# Patient Record
Sex: MALE | Race: ASIAN | Employment: FULL TIME | URBAN - METROPOLITAN AREA
[De-identification: names, ages, dates, MRNs, and addresses within clinical notes are randomized per-mention and may not be internally consistent; named-entity substitution may affect disease eponyms.]

---

## 2018-10-13 PROCEDURE — 86803 HEPATITIS C AB TEST: CPT | Performed by: INTERNAL MEDICINE

## 2018-10-13 PROCEDURE — 87389 HIV-1 AG W/HIV-1&-2 AB AG IA: CPT | Performed by: INTERNAL MEDICINE

## 2019-02-06 ENCOUNTER — LAB ENCOUNTER (OUTPATIENT)
Dept: LAB | Age: 39
End: 2019-02-06
Attending: Other
Payer: COMMERCIAL

## 2019-02-06 DIAGNOSIS — Z79.899 ENCOUNTER FOR LONG-TERM (CURRENT) DRUG USE: Primary | ICD-10-CM

## 2019-02-06 LAB
T4 FREE SERPL-MCNC: 0.9 NG/DL (ref 0.9–1.8)
TSI SER-ACNC: 0.69 MIU/ML (ref 0.35–5.5)

## 2019-02-06 PROCEDURE — 84443 ASSAY THYROID STIM HORMONE: CPT

## 2019-02-06 PROCEDURE — 84439 ASSAY OF FREE THYROXINE: CPT

## 2019-03-18 ENCOUNTER — LAB ENCOUNTER (OUTPATIENT)
Dept: LAB | Age: 39
End: 2019-03-18
Attending: Other
Payer: COMMERCIAL

## 2019-03-18 DIAGNOSIS — Z79.899 ENCOUNTER FOR LONG-TERM (CURRENT) USE OF OTHER MEDICATIONS: Primary | ICD-10-CM

## 2019-03-18 LAB — TSI SER-ACNC: 0.86 MIU/ML (ref 0.36–3.74)

## 2019-03-18 PROCEDURE — 84443 ASSAY THYROID STIM HORMONE: CPT

## 2019-04-22 ENCOUNTER — ORDER TRANSCRIPTION (OUTPATIENT)
Dept: PHYSICAL THERAPY | Facility: HOSPITAL | Age: 39
End: 2019-04-22

## 2019-04-22 DIAGNOSIS — M54.2 NECK PAIN: Primary | ICD-10-CM

## 2019-04-26 ENCOUNTER — OFFICE VISIT (OUTPATIENT)
Dept: PHYSICAL THERAPY | Facility: HOSPITAL | Age: 39
End: 2019-04-26
Payer: COMMERCIAL

## 2019-04-26 DIAGNOSIS — M54.2 NECK PAIN: ICD-10-CM

## 2019-04-26 PROCEDURE — 97162 PT EVAL MOD COMPLEX 30 MIN: CPT | Performed by: PHYSICAL THERAPIST

## 2019-04-26 PROCEDURE — 97140 MANUAL THERAPY 1/> REGIONS: CPT | Performed by: PHYSICAL THERAPIST

## 2019-04-26 PROCEDURE — 97112 NEUROMUSCULAR REEDUCATION: CPT | Performed by: PHYSICAL THERAPIST

## 2019-04-26 NOTE — PROGRESS NOTES
CERVICAL SPINE EVALUATION:   Referring Physician: Luly Heredia MD    Diagnosis: Neck Pain  Date of Service: 4/26/2019   Date of Onset: 6 years ago        SUBJECTIVE:   PATIENT SUMMARY:  Dayami Ngo is a 35year old y/o male who presents to therapy Significant findings include exercise-induced asthma. 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   He reports the intense pain subsisted but he TTP at the C-T junction        PLAN OF CARE:    Goals: The pt was educated on the plan of care, purpose and individual goals for therapy, precautions for therapy. All questions were answered. 1.  The pt will be independent in their HEP.   2.  Centr From: 4/26/2019      To: 7/25/2019

## 2019-05-03 ENCOUNTER — OFFICE VISIT (OUTPATIENT)
Dept: PHYSICAL THERAPY | Facility: HOSPITAL | Age: 39
End: 2019-05-03
Payer: COMMERCIAL

## 2019-05-03 PROCEDURE — 97140 MANUAL THERAPY 1/> REGIONS: CPT | Performed by: PHYSICAL THERAPIST

## 2019-05-03 PROCEDURE — 97112 NEUROMUSCULAR REEDUCATION: CPT | Performed by: PHYSICAL THERAPIST

## 2019-05-03 NOTE — PROGRESS NOTES
5/3/2019  Dx: Neck Pain           Authorized # of Visits: 2/12         Next MD visit: none   Fall Risk: standard         Precautions:   Imaging:           Medication Changes since last visit?: No  Subjective: States his arm is better.    Hand tingling is le Therapeutic Activity; Patient education; Home exercise program instruction; TNE Education, Modalities as needed.     Certification From: 1/94/1939      To: 7/25/2019      Charges: JAQUELINE Dunn       Total Timed Treatment: 45 min  Total Treatment Time: 45 min limitations include inability to work out, difficulty sleeping, pain when looking down. Candy Walsh describes prior level of function as independent in all actvities. Pt goals include decrease pain, improve his ability to work out.   Past medical history was rev currently, 27% predicted at visit #10   NDI 14.3   FABQ 30(10)         Palpation: TTP at the C-T junction

## 2019-05-09 ENCOUNTER — OFFICE VISIT (OUTPATIENT)
Dept: PHYSICAL THERAPY | Facility: HOSPITAL | Age: 39
End: 2019-05-09
Attending: ORTHOPAEDIC SURGERY
Payer: COMMERCIAL

## 2019-05-09 PROCEDURE — 97112 NEUROMUSCULAR REEDUCATION: CPT | Performed by: PHYSICAL THERAPIST

## 2019-05-09 PROCEDURE — 97140 MANUAL THERAPY 1/> REGIONS: CPT | Performed by: PHYSICAL THERAPIST

## 2019-05-09 NOTE — PROGRESS NOTES
5/9/2019  Dx: Neck Pain           Authorized # of Visits: 3/12         Next MD visit: none   Fall Risk: standard         Precautions:   Imaging:           Medication Changes since last visit?: No  Subjective:   Reports he had some different tingling in his The pt will be able to sleep through the night without waking up from pain. Frequency/Duration: Patient will be seen for 1-2 x/week or a total of 12 visits over a 90 day period. Treatment will include: Manual Therapy; Therapeutic Exercises;  Neuromuscula working/communiting. Plays tennis once a week, runs 15 minutes once a week. Running too much will bother his neck. Pain Started 6 years ago. 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         JASPAL ext (C6) 5 5   Triceps (C7) 5 5   Wrist flex (C7) 5 5   EPL (C8)  5 5   Interossei (T1) 5 5       Flexibility:   R L   Upper trap long long   Pec Major short short   Pec Minor short short   Lats short short     Outcome Surverys  Date 4/26/2019   FOTO 33% l

## 2019-05-10 ENCOUNTER — APPOINTMENT (OUTPATIENT)
Dept: PHYSICAL THERAPY | Facility: HOSPITAL | Age: 39
End: 2019-05-10
Payer: COMMERCIAL

## 2019-05-17 ENCOUNTER — OFFICE VISIT (OUTPATIENT)
Dept: PHYSICAL THERAPY | Facility: HOSPITAL | Age: 39
End: 2019-05-17
Payer: COMMERCIAL

## 2019-05-17 PROCEDURE — 97112 NEUROMUSCULAR REEDUCATION: CPT | Performed by: PHYSICAL THERAPIST

## 2019-05-17 PROCEDURE — 97140 MANUAL THERAPY 1/> REGIONS: CPT | Performed by: PHYSICAL THERAPIST

## 2019-05-17 NOTE — PROGRESS NOTES
5/17/2019  Dx: Neck Pain           Authorized # of Visits: 4/12         Next MD visit: none   Fall Risk: standard         Precautions:   Imaging:           Medication Changes since last visit?: No  Subjective:   Reports he continue to feel pain in the L UT All questions were answered. 1.  The pt will be independent in their HEP. 2.  Centralization of symptoms to the cervical spine. 3.  The pt will be able to tolerate a full work day with minimal symptoms.   4.  The pt will display a neutral chest wall p minimal relief. Sees a massage therapist regularly and gets temporally relief. R handed. 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 Not wearing goggles, states he bends over to see his surgeries. Sleeps on his back but ends up in his sides.   Wakes up an Expansion +  +   Posterior Mediastinum Expansion Test + +   Elevated and ER Anterior Rib + +       Shoulder AROM:      R    L   Flex 160 180   Abd 165 180   ER NT NT   IR 45 60   Hort abd WFL decreased compared to R            Strength UE:   5/5 MMT Scale

## 2019-05-24 ENCOUNTER — OFFICE VISIT (OUTPATIENT)
Dept: PHYSICAL THERAPY | Facility: HOSPITAL | Age: 39
End: 2019-05-24
Payer: COMMERCIAL

## 2019-05-24 PROCEDURE — 97112 NEUROMUSCULAR REEDUCATION: CPT | Performed by: PHYSICAL THERAPIST

## 2019-05-24 PROCEDURE — 97140 MANUAL THERAPY 1/> REGIONS: CPT | Performed by: PHYSICAL THERAPIST

## 2019-05-24 NOTE — PROGRESS NOTES
5/24/2019  Dx: Neck Pain           Authorized # of Visits: 5/12         Next MD visit: none   Fall Risk: standard         Precautions:   Imaging:           Medication Changes since last visit?: No  Subjective:   Reports he is feeling a little bit better. improved chest wall mobility and less first rib restriction. Continues to need cues to engage the L abd wall and was educated to use RAJESH positions during his daily routine. Goals:      The pt was educated on the plan of care, purpose and individual goal experienced 50-60% improvement but not full resolution of symptoms. Had manipulation therapy from a PT which helped slightly. Reports had some hearing issues in the past but those have resolved.   Had PT with scapular strengthening and neck strengthening w to his PLOF.     Precautions: None       OBJECTIVE:   Observation/Posture: decreased cervical lordosis and thoracic kyphosis      Repeated Motion Testing: retraction/extension to end ROM abolished R hand paraesthesias      RAJESH Testing:    RAJESH Testing R L

## 2019-06-03 ENCOUNTER — APPOINTMENT (OUTPATIENT)
Dept: PHYSICAL THERAPY | Facility: HOSPITAL | Age: 39
End: 2019-06-03
Payer: COMMERCIAL

## 2019-06-07 ENCOUNTER — OFFICE VISIT (OUTPATIENT)
Dept: PHYSICAL THERAPY | Facility: HOSPITAL | Age: 39
End: 2019-06-07
Payer: COMMERCIAL

## 2019-06-07 PROCEDURE — 97112 NEUROMUSCULAR REEDUCATION: CPT | Performed by: PHYSICAL THERAPIST

## 2019-06-07 PROCEDURE — 97140 MANUAL THERAPY 1/> REGIONS: CPT | Performed by: PHYSICAL THERAPIST

## 2019-06-07 NOTE — PROGRESS NOTES
6/7/2019  Dx: Neck Pain           Authorized # of Visits: 6/12         Next MD visit: none   Fall Risk: standard         Precautions:   Imaging:           Medication Changes since last visit?: No  Subjective:   Reports he is feeling a little bit better.   Suzi Doan activation    Standing supported wall reach   Diaphragmatic breathing Balloon exhalation into the balloon Standing RAJESH squat with balloon    PME expansion against wall    Patterned vs preferred handout SL Integration #34 R SL Resisted L glut max with R glu Total Timed Treatment: 45min  Total Treatment Time: 50min        For reference only  CERVICAL SPINE EVALUATION:   Referring Physician: Sayra Forte MD    Diagnosis: Neck Pain  Date of Service: 4/26/2019   Date of Onset: 6 years ago        SUBJECTIVE: improve his ability to work out. Past medical history was reviewed with Huang. Significant findings include exercise-induced asthma.         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

## 2019-06-28 ENCOUNTER — OFFICE VISIT (OUTPATIENT)
Dept: PHYSICAL THERAPY | Facility: HOSPITAL | Age: 39
End: 2019-06-28
Attending: ORTHOPAEDIC SURGERY
Payer: COMMERCIAL

## 2019-06-28 PROCEDURE — 97140 MANUAL THERAPY 1/> REGIONS: CPT | Performed by: PHYSICAL THERAPIST

## 2019-06-28 NOTE — PROGRESS NOTES
6/28/2019    Dx: Neck Pain           Authorized # of Visits: 7/12         Next MD visit: none   Fall Risk: standard         Precautions:   Imaging:           Medication Changes since last visit?: No  Subjective: Reports he has improved overall.   Reports he techniques to improved R C1C2 mobility secondary to increased complaints in that area. The pt reported good relief from treatment and was advised to add L gaze exercises to standing RAJESH exercises. Note full hort abd and shoulder IR bilaterally this date. had migraines. Saw a chiropractor who did Gladys therapy. States he experienced 50-60% improvement but not full resolution of symptoms. Had manipulation therapy from a PT which helped slightly.   Reports had some hearing issues in the past but those hav position/respiratory patterns. He will benefit from skilled PT to return to his PLOF.     Precautions: None       OBJECTIVE:   Observation/Posture: decreased cervical lordosis and thoracic kyphosis      Repeated Motion Testing: retraction/extension to end

## 2019-07-08 ENCOUNTER — TELEPHONE (OUTPATIENT)
Dept: PHYSICAL THERAPY | Age: 39
End: 2019-07-08

## 2019-07-12 ENCOUNTER — APPOINTMENT (OUTPATIENT)
Dept: PHYSICAL THERAPY | Facility: HOSPITAL | Age: 39
End: 2019-07-12
Attending: ORTHOPAEDIC SURGERY
Payer: COMMERCIAL

## 2019-07-19 ENCOUNTER — OFFICE VISIT (OUTPATIENT)
Dept: PHYSICAL THERAPY | Facility: HOSPITAL | Age: 39
End: 2019-07-19
Attending: ORTHOPAEDIC SURGERY
Payer: COMMERCIAL

## 2019-07-19 PROCEDURE — 97140 MANUAL THERAPY 1/> REGIONS: CPT | Performed by: PHYSICAL THERAPIST

## 2019-07-19 PROCEDURE — 97112 NEUROMUSCULAR REEDUCATION: CPT | Performed by: PHYSICAL THERAPIST

## 2019-07-19 NOTE — PROGRESS NOTES
7/19/2019    Patient Name: Paresh Bentley  YOB: 1980          MRN number:  [id number]  Referring Physician:  Alejandrina Blair    Progress Summary    Pt has attended 9, cancelled 1, and no shown 0 visits in Physical Therapy.    Dx: Neck Pain flexion     BC manual technique  2. Sternal    C1C2 unilateral mobs R    Side glides C1C2 and C2C3 R to L    MFR cervical spine    CS cranium sacrum technique Manual splenoid flexion     BC manual technique  2.   Sternal    C1C2 unilateral mobs R    Side g wall position. PROGRESSING 7/19/2019  5. The pt will be able to tolerate a modified workout program without an increase in symptoms. PROGRESSING 7/19/2019  6. The pt will be able to sleep through the night without waking up from pain.   PROGRESSING 7/19/

## 2019-07-26 ENCOUNTER — OFFICE VISIT (OUTPATIENT)
Dept: PHYSICAL THERAPY | Facility: HOSPITAL | Age: 39
End: 2019-07-26
Attending: ORTHOPAEDIC SURGERY
Payer: COMMERCIAL

## 2019-07-26 PROCEDURE — 97112 NEUROMUSCULAR REEDUCATION: CPT | Performed by: PHYSICAL THERAPIST

## 2019-07-26 PROCEDURE — 97140 MANUAL THERAPY 1/> REGIONS: CPT | Performed by: PHYSICAL THERAPIST

## 2019-07-26 NOTE — PROGRESS NOTES
7/26/2019      Patient Name: Nena San  YOB: 1980          MRN number:  [id number]  Referring Physician:  Beth Dempsey      Pt has attended 9, cancelled 1, and no shown 0 visits in Physical Therapy.    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   He no longer c/o R sided radicular symptoms in the UE and displays and for this course of care. Thank you for your referral. Please co-sign or sign and return this letter via fax as soon as possible to [phone]. If you have any questions, please contact me at Dept: [phone].     Sincerely,  Roslyn Lennox

## 2019-08-09 ENCOUNTER — OFFICE VISIT (OUTPATIENT)
Dept: PHYSICAL THERAPY | Facility: HOSPITAL | Age: 39
End: 2019-08-09
Attending: ORTHOPAEDIC SURGERY
Payer: COMMERCIAL

## 2019-08-09 PROCEDURE — 97112 NEUROMUSCULAR REEDUCATION: CPT | Performed by: PHYSICAL THERAPIST

## 2019-08-09 PROCEDURE — 97140 MANUAL THERAPY 1/> REGIONS: CPT | Performed by: PHYSICAL THERAPIST

## 2019-08-09 NOTE — PROGRESS NOTES
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 He no longer c/o R sided radicular symptoms in the UE and displays improved breathing patterns.   He reports increased ability to tolerate a co-sign or sign and return this letter via fax as soon as possible to [phone]. If you have any questions, please contact me at Dept: [phone].     Sincerely,  Mick Wyatt    Electronically signed by therapist: Mick Wyatt, PT    Physician's certi

## 2019-08-12 ENCOUNTER — TELEPHONE (OUTPATIENT)
Dept: PHYSICAL THERAPY | Facility: HOSPITAL | Age: 39
End: 2019-08-12

## 2019-08-12 NOTE — PROGRESS NOTES
8/9/2019    Patient Name: Sally Box  YOB: 1980          MRN number:  [id number]  Referring Physician:  Anne Campos      Dx: Neck Pain           Authorized # of Visits: 10/12         Next MD visit: none   Fall Risk: standard         Pr T4  4.  R subclavious  5.  R intercostal release  6.   L pec major        Side glides C5C6 and C6C7    Prone unilateral and central PA's C5-C6 R    First rib MET/Mob R   Therapeutic Exercise       Therapeutic Activity       Neuromuscular Education Supine ho Patient education; Home exercise program instruction; TNE Education, Modalities as needed.         Charges: Man2, NM1      Total Timed Treatment: 45min  Total Treatment Time: 50min      Patient was advised of these findings, precautions, and treatment optio

## 2019-08-16 ENCOUNTER — APPOINTMENT (OUTPATIENT)
Dept: PHYSICAL THERAPY | Facility: HOSPITAL | Age: 39
End: 2019-08-16
Attending: ORTHOPAEDIC SURGERY
Payer: COMMERCIAL

## 2019-08-23 ENCOUNTER — OFFICE VISIT (OUTPATIENT)
Dept: PHYSICAL THERAPY | Facility: HOSPITAL | Age: 39
End: 2019-08-23
Attending: ORTHOPAEDIC SURGERY
Payer: COMMERCIAL

## 2019-08-23 PROCEDURE — 97112 NEUROMUSCULAR REEDUCATION: CPT | Performed by: PHYSICAL THERAPIST

## 2019-08-23 PROCEDURE — 97140 MANUAL THERAPY 1/> REGIONS: CPT | Performed by: PHYSICAL THERAPIST

## 2019-08-23 NOTE — PROGRESS NOTES
8/23/2019    Patient Name: Lissette Guo  YOB: 1980          MRN number:  [id number]  Referring Physician:  Cristin Mai    Dx: Neck Pain           Authorized # of Visits: 11/12         Next MD visit: none   Fall Risk: standard         Pre release  6. L pec major        Side glides C5C6 and C6C7    Prone unilateral and central PA's C5-C6 R    First rib MET/Mob R   Brachial Chain Manual Techniques  1. L AIC  2. Sternal Technique  3.  R superior T4  4.   R subclavious  5.  R intercostal rele PROGRESSING 7/19/2019  4. The pt will display a neutral chest wall position. PROGRESSING 7/19/2019  5. The pt will be able to tolerate a modified workout program without an increase in symptoms. PROGRESSING 7/19/2019  6.   The pt will be able to sleep th

## 2019-08-30 ENCOUNTER — APPOINTMENT (OUTPATIENT)
Dept: PHYSICAL THERAPY | Facility: HOSPITAL | Age: 39
End: 2019-08-30
Attending: ORTHOPAEDIC SURGERY
Payer: COMMERCIAL

## 2019-10-04 ENCOUNTER — OFFICE VISIT (OUTPATIENT)
Dept: PHYSICAL THERAPY | Facility: HOSPITAL | Age: 39
End: 2019-10-04
Attending: PHYSICAL MEDICINE & REHABILITATION
Payer: COMMERCIAL

## 2019-10-04 PROCEDURE — 97140 MANUAL THERAPY 1/> REGIONS: CPT | Performed by: PHYSICAL THERAPIST

## 2019-10-04 PROCEDURE — 97112 NEUROMUSCULAR REEDUCATION: CPT | Performed by: PHYSICAL THERAPIST

## 2019-10-04 NOTE — PROGRESS NOTES
10/4/2019    Patient Name: Dick Rodriguez  YOB: 1980          MRN number:  [id number]  Referring Physician:  Ej Markham    Dx: Neck Pain           Authorized # of Visits: 12/12         Next MD visit: none   Fall Risk: standard         Pre pec major        Side glides C5C6 and C6C7    Prone unilateral and central PA's C5-C6 R    First rib MET/Mob R Suboccipital release    Prone unilateral PA's C1C2 R    T spine mobs in prone    First rib mob/MET R C1C2 mobs in supine and prone    Manual sple an increase in symptoms. PROGRESSING 7/19/2019  6. The pt will be able to sleep through the night without waking up from pain. PROGRESSING 7/19/2019    Frequency/Duration: Patient will be seen for 1 x/week or a total of 10 visits over a 90 day period.  Saurav Pineda

## 2019-10-24 ENCOUNTER — OFFICE VISIT (OUTPATIENT)
Dept: PHYSICAL THERAPY | Facility: HOSPITAL | Age: 39
End: 2019-10-24
Attending: PHYSICAL MEDICINE & REHABILITATION
Payer: COMMERCIAL

## 2019-10-24 PROCEDURE — 97112 NEUROMUSCULAR REEDUCATION: CPT | Performed by: PHYSICAL THERAPIST

## 2019-10-24 NOTE — PROGRESS NOTES
10/24/2019    Patient Name: Dayami Ngo  YOB: 1980          MRN number:  [id number]  Referring Physician:  Jayant Mckeon    Dx: Neck Pain           Authorized # of Visits: 15         Next MD visit: none   Fall Risk: standard         Preca spine    Tspine mobs in prone  Brachial Chain Manual Techniques  1. L AIC  2. Sternal Technique  3.  R superior T4  4. R subclavious  5.  R intercostal release  6.   L pec major        Side glides C5C6 and C6C7    Prone unilateral and central PA's C5-C6 the plan of care, purpose and individual goals for therapy, precautions for therapy. All questions were answered. 1.  The pt will be independent in their HEP. MET 7/19/2019  2. Centralization of symptoms to the cervical spine. NET 7/19/2019  3.   The

## 2019-10-25 ENCOUNTER — APPOINTMENT (OUTPATIENT)
Dept: PHYSICAL THERAPY | Facility: HOSPITAL | Age: 39
End: 2019-10-25
Attending: PHYSICAL MEDICINE & REHABILITATION
Payer: COMMERCIAL

## 2019-12-06 ENCOUNTER — OFFICE VISIT (OUTPATIENT)
Dept: PHYSICAL THERAPY | Facility: HOSPITAL | Age: 39
End: 2019-12-06
Attending: ORTHOPAEDIC SURGERY
Payer: COMMERCIAL

## 2019-12-06 PROCEDURE — 97140 MANUAL THERAPY 1/> REGIONS: CPT | Performed by: PHYSICAL THERAPIST

## 2019-12-06 PROCEDURE — 97112 NEUROMUSCULAR REEDUCATION: CPT | Performed by: PHYSICAL THERAPIST

## 2019-12-06 NOTE — PROGRESS NOTES
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 He no longer has radicular symptoms and can tolerate his work day more easily.   States he continues to get some crackling in the R SCM by Date____________________    Certification From: 0/80/2521  To:10/17/2019

## (undated) NOTE — LETTER
Dear Dr. Montes De [facility]    This letter is to inform you that Estevan Hanna has been attending Physical Therapy with me. See below for my most recent plan of care.      7/19/2019    Patient Name: Estevan Hanna  YOB: 1980          MRN number: 6.  L pec major        Side glides in sitting    C1C2 mobs in prone    Facet massage  Manual traction  Manual splenoid flexion     BC manual technique  2. Sternal  Manual splenoid flexion     BC manual technique  2.   Sternal    C1C2 unilateral mobs R    S 2.  Centralization of symptoms to the cervical spine. NET 7/19/2019  3. The pt will be able to tolerate a full work day with minimal symptoms. PROGRESSING 7/19/2019  4. The pt will display a neutral chest wall position. PROGRESSING 7/19/2019  5.   The pt